# Patient Record
Sex: FEMALE | Race: OTHER | URBAN - METROPOLITAN AREA
[De-identification: names, ages, dates, MRNs, and addresses within clinical notes are randomized per-mention and may not be internally consistent; named-entity substitution may affect disease eponyms.]

---

## 2022-08-20 ENCOUNTER — EMERGENCY (EMERGENCY)
Facility: HOSPITAL | Age: 32
LOS: 1 days | Discharge: HOME | End: 2022-08-20
Attending: EMERGENCY MEDICINE | Admitting: EMERGENCY MEDICINE
Payer: MEDICARE

## 2022-08-20 VITALS
HEART RATE: 106 BPM | DIASTOLIC BLOOD PRESSURE: 87 MMHG | SYSTOLIC BLOOD PRESSURE: 118 MMHG | TEMPERATURE: 98 F | RESPIRATION RATE: 18 BRPM | OXYGEN SATURATION: 98 %

## 2022-08-20 VITALS
WEIGHT: 160.06 LBS | TEMPERATURE: 99 F | RESPIRATION RATE: 18 BRPM | OXYGEN SATURATION: 97 % | SYSTOLIC BLOOD PRESSURE: 119 MMHG | HEIGHT: 64 IN | HEART RATE: 125 BPM | DIASTOLIC BLOOD PRESSURE: 82 MMHG

## 2022-08-20 DIAGNOSIS — R19.7 DIARRHEA, UNSPECIFIED: ICD-10-CM

## 2022-08-20 DIAGNOSIS — R62.50 UNSPECIFIED LACK OF EXPECTED NORMAL PHYSIOLOGICAL DEVELOPMENT IN CHILDHOOD: ICD-10-CM

## 2022-08-20 DIAGNOSIS — Z20.822 CONTACT WITH AND (SUSPECTED) EXPOSURE TO COVID-19: ICD-10-CM

## 2022-08-20 DIAGNOSIS — R50.9 FEVER, UNSPECIFIED: ICD-10-CM

## 2022-08-20 DIAGNOSIS — E87.6 HYPOKALEMIA: ICD-10-CM

## 2022-08-20 LAB
ALBUMIN SERPL ELPH-MCNC: 3.8 G/DL — SIGNIFICANT CHANGE UP (ref 3.3–5)
ALP SERPL-CCNC: 80 U/L — SIGNIFICANT CHANGE UP (ref 40–120)
ALT FLD-CCNC: 6 U/L — LOW (ref 10–45)
ANION GAP SERPL CALC-SCNC: 14 MMOL/L — SIGNIFICANT CHANGE UP (ref 5–17)
ANISOCYTOSIS BLD QL: SLIGHT — SIGNIFICANT CHANGE UP
APPEARANCE UR: CLEAR — SIGNIFICANT CHANGE UP
AST SERPL-CCNC: 15 U/L — SIGNIFICANT CHANGE UP (ref 10–40)
BACTERIA # UR AUTO: PRESENT /HPF
BASOPHILS # BLD AUTO: 0 K/UL — SIGNIFICANT CHANGE UP (ref 0–0.2)
BASOPHILS NFR BLD AUTO: 0 % — SIGNIFICANT CHANGE UP (ref 0–2)
BILIRUB SERPL-MCNC: 0.4 MG/DL — SIGNIFICANT CHANGE UP (ref 0.2–1.2)
BILIRUB UR-MCNC: NEGATIVE — SIGNIFICANT CHANGE UP
BLASTS # FLD: 1.2 % — HIGH (ref 0–0)
BUN SERPL-MCNC: 12 MG/DL — SIGNIFICANT CHANGE UP (ref 7–23)
CALCIUM SERPL-MCNC: 8.6 MG/DL — SIGNIFICANT CHANGE UP (ref 8.4–10.5)
CHLORIDE SERPL-SCNC: 89 MMOL/L — LOW (ref 96–108)
CO2 SERPL-SCNC: 25 MMOL/L — SIGNIFICANT CHANGE UP (ref 22–31)
COLOR SPEC: YELLOW — SIGNIFICANT CHANGE UP
CREAT SERPL-MCNC: 0.74 MG/DL — SIGNIFICANT CHANGE UP (ref 0.5–1.3)
CULTURE RESULTS: SIGNIFICANT CHANGE UP
DIFF PNL FLD: ABNORMAL
EGFR: 110 ML/MIN/1.73M2 — SIGNIFICANT CHANGE UP
EOSINOPHIL # BLD AUTO: 0.06 K/UL — SIGNIFICANT CHANGE UP (ref 0–0.5)
EOSINOPHIL NFR BLD AUTO: 0.6 % — SIGNIFICANT CHANGE UP (ref 0–6)
EPI CELLS # UR: SIGNIFICANT CHANGE UP /HPF (ref 0–5)
GLUCOSE SERPL-MCNC: 116 MG/DL — HIGH (ref 70–99)
GLUCOSE UR QL: NEGATIVE — SIGNIFICANT CHANGE UP
HCT VFR BLD CALC: 41.5 % — SIGNIFICANT CHANGE UP (ref 34.5–45)
HGB BLD-MCNC: 13.6 G/DL — SIGNIFICANT CHANGE UP (ref 11.5–15.5)
KETONES UR-MCNC: NEGATIVE — SIGNIFICANT CHANGE UP
LACTATE SERPL-SCNC: 1.3 MMOL/L — SIGNIFICANT CHANGE UP (ref 0.5–2)
LEUKOCYTE ESTERASE UR-ACNC: NEGATIVE — SIGNIFICANT CHANGE UP
LIDOCAIN IGE QN: 24 U/L — SIGNIFICANT CHANGE UP (ref 7–60)
LYMPHOCYTES # BLD AUTO: 1.45 K/UL — SIGNIFICANT CHANGE UP (ref 1–3.3)
LYMPHOCYTES # BLD AUTO: 15.4 % — SIGNIFICANT CHANGE UP (ref 13–44)
MACROCYTES BLD QL: SLIGHT — SIGNIFICANT CHANGE UP
MANUAL SMEAR VERIFICATION: SIGNIFICANT CHANGE UP
MCHC RBC-ENTMCNC: 26 PG — LOW (ref 27–34)
MCHC RBC-ENTMCNC: 32.8 GM/DL — SIGNIFICANT CHANGE UP (ref 32–36)
MCV RBC AUTO: 79.2 FL — LOW (ref 80–100)
METAMYELOCYTES # FLD: 2.5 % — HIGH (ref 0–0)
MONOCYTES # BLD AUTO: 2.09 K/UL — HIGH (ref 0–0.9)
MONOCYTES NFR BLD AUTO: 22.2 % — HIGH (ref 2–14)
NEUTROPHILS # BLD AUTO: 4.31 K/UL — SIGNIFICANT CHANGE UP (ref 1.8–7.4)
NEUTROPHILS NFR BLD AUTO: 30.9 % — LOW (ref 43–77)
NEUTS BAND # BLD: 14.8 % — HIGH (ref 0–8)
NITRITE UR-MCNC: NEGATIVE — SIGNIFICANT CHANGE UP
OVALOCYTES BLD QL SMEAR: SLIGHT — SIGNIFICANT CHANGE UP
PH UR: 6 — SIGNIFICANT CHANGE UP (ref 5–8)
PLAT MORPH BLD: ABNORMAL
PLATELET # BLD AUTO: 468 K/UL — HIGH (ref 150–400)
POIKILOCYTOSIS BLD QL AUTO: SIGNIFICANT CHANGE UP
POLYCHROMASIA BLD QL SMEAR: SLIGHT — SIGNIFICANT CHANGE UP
POTASSIUM SERPL-MCNC: 3.2 MMOL/L — LOW (ref 3.5–5.3)
POTASSIUM SERPL-SCNC: 3.2 MMOL/L — LOW (ref 3.5–5.3)
PROT SERPL-MCNC: 7.5 G/DL — SIGNIFICANT CHANGE UP (ref 6–8.3)
PROT UR-MCNC: NEGATIVE MG/DL — SIGNIFICANT CHANGE UP
RBC # BLD: 5.24 M/UL — HIGH (ref 3.8–5.2)
RBC # FLD: 13.9 % — SIGNIFICANT CHANGE UP (ref 10.3–14.5)
RBC BLD AUTO: ABNORMAL
RBC CASTS # UR COMP ASSIST: < 5 /HPF — SIGNIFICANT CHANGE UP
SARS-COV-2 RNA SPEC QL NAA+PROBE: NEGATIVE — SIGNIFICANT CHANGE UP
SODIUM SERPL-SCNC: 128 MMOL/L — LOW (ref 135–145)
SP GR SPEC: <1.005 — SIGNIFICANT CHANGE UP (ref 1–1.03)
SPECIMEN SOURCE: SIGNIFICANT CHANGE UP
UROBILINOGEN FLD QL: 0.2 E.U./DL — SIGNIFICANT CHANGE UP
VARIANT LYMPHS # BLD: 12.4 % — HIGH (ref 0–6)
WBC # BLD: 9.43 K/UL — SIGNIFICANT CHANGE UP (ref 3.8–10.5)
WBC # FLD AUTO: 9.43 K/UL — SIGNIFICANT CHANGE UP (ref 3.8–10.5)
WBC UR QL: < 5 /HPF — SIGNIFICANT CHANGE UP

## 2022-08-20 PROCEDURE — 87635 SARS-COV-2 COVID-19 AMP PRB: CPT

## 2022-08-20 PROCEDURE — 85025 COMPLETE CBC W/AUTO DIFF WBC: CPT

## 2022-08-20 PROCEDURE — 99284 EMERGENCY DEPT VISIT MOD MDM: CPT | Mod: FS

## 2022-08-20 PROCEDURE — 80053 COMPREHEN METABOLIC PANEL: CPT

## 2022-08-20 PROCEDURE — 99283 EMERGENCY DEPT VISIT LOW MDM: CPT | Mod: 25

## 2022-08-20 PROCEDURE — 87507 IADNA-DNA/RNA PROBE TQ 12-25: CPT

## 2022-08-20 PROCEDURE — 36415 COLL VENOUS BLD VENIPUNCTURE: CPT

## 2022-08-20 PROCEDURE — 83690 ASSAY OF LIPASE: CPT

## 2022-08-20 PROCEDURE — 83605 ASSAY OF LACTIC ACID: CPT

## 2022-08-20 PROCEDURE — 81001 URINALYSIS AUTO W/SCOPE: CPT

## 2022-08-20 PROCEDURE — 96360 HYDRATION IV INFUSION INIT: CPT

## 2022-08-20 PROCEDURE — 87040 BLOOD CULTURE FOR BACTERIA: CPT

## 2022-08-20 RX ORDER — POTASSIUM CHLORIDE 20 MEQ
40 PACKET (EA) ORAL ONCE
Refills: 0 | Status: COMPLETED | OUTPATIENT
Start: 2022-08-20 | End: 2022-08-20

## 2022-08-20 RX ORDER — SODIUM CHLORIDE 9 MG/ML
1000 INJECTION INTRAMUSCULAR; INTRAVENOUS; SUBCUTANEOUS ONCE
Refills: 0 | Status: COMPLETED | OUTPATIENT
Start: 2022-08-20 | End: 2022-08-20

## 2022-08-20 RX ADMIN — SODIUM CHLORIDE 1000 MILLILITER(S): 9 INJECTION INTRAMUSCULAR; INTRAVENOUS; SUBCUTANEOUS at 13:06

## 2022-08-20 RX ADMIN — SODIUM CHLORIDE 1000 MILLILITER(S): 9 INJECTION INTRAMUSCULAR; INTRAVENOUS; SUBCUTANEOUS at 12:13

## 2022-08-20 RX ADMIN — SODIUM CHLORIDE 1000 MILLILITER(S): 9 INJECTION INTRAMUSCULAR; INTRAVENOUS; SUBCUTANEOUS at 12:54

## 2022-08-20 RX ADMIN — Medication 40 MILLIEQUIVALENT(S): at 12:54

## 2022-08-20 NOTE — ED PROVIDER NOTE - ATTENDING APP SHARED VISIT CONTRIBUTION OF CARE
30 yo developmentally delayed f presents to ED with parents secondary to several days of diarrhea.  No fevers, + recent  travel.  Denies abd pain.  No n/v.  On exam, patient in NAD.  Abd soft, NT/ND.  Will obtain labs, hydrate and dispo accordingly.

## 2022-08-20 NOTE — ED PROVIDER NOTE - CLINICAL SUMMARY MEDICAL DECISION MAKING FREE TEXT BOX
pt brought by parents for diarrhea x 1 wk, traveled to [place] (parents w/o any symptoms), no n/v/abd pain, mother states subjective fever but has never checked a temp. pt afebrile, well appearing, non toxic, benign abd, suspect traveler's diarrhea vs viral, labs consistent w/dehydration - K+ slightly low hence replaced, stool studies sent- will hold abx pending results, po hydration and BRAT diet discussed at length, parents understand and agree w/plan, strict return precautions given

## 2022-08-20 NOTE — ED PROVIDER NOTE - NSFOLLOWUPINSTRUCTIONS_ED_ALL_ED_FT
DRINK LOTS OF FLUIDS (WATER, GETORADE, VITAMIN WATER, PEDIALYTE), EAT BREAD, RICE, CEREAL, TOAST, APPLE SAUCE, BANANAS, JELLO, ETC. AVOID SPICY, FRIED, FATTY FOODS AND DAIRY   Food Choices to Help Relieve Diarrhea, Adult  Diarrhea can make you feel weak and cause you to become dehydrated. It is important to choose the right foods and drinks to:  •Relieve diarrhea.  •Replace lost fluids and nutrients  •Prevent dehydration.  What are tips for following this plan?  Relieving diarrhea   •Avoid foods that make your diarrhea worse. These may include:  •Foods and beverages sweetened with high-fructose corn syrup, honey, or sweeteners such as xylitol, sorbitol, and mannitol.  •Fried, greasy, or spicy foods.  •Raw fruits and vegetables.  •Eat foods that are rich in probiotics. These include foods such as yogurt and fermented milk products. Probiotics can help increase healthy bacteria in your stomach and intestines (gastrointestinal tract or GI tract). This may help digestion and stop diarrhea.  •If you have lactose intolerance, avoid dairy products. These may make your diarrhea worse.  •Take medicine to help stop diarrhea only as told by your health care provider.  Replacing nutrients   •Eat bland, easy-to-digest foods in small amounts as you are able, until your diarrhea starts to get better. These foods include bananas, applesauce, rice, toast, and crackers.  •Gradually reintroduce nutrient-rich foods as tolerated or as told by your health care provider. This includes:  •Well-cooked protein foods, such as eggs, lean meats like fish or chicken without skin, and tofu.  •Peeled, seeded, and soft-cooked fruits and vegetables.  •Low-fat dairy products.  •Whole grains.  •Take vitamin and mineral supplements as told by your health care provider.  Preventing dehydration   •Start by sipping water or a solution to prevent dehydration (oral rehydration solution, ORS). This is a drink that helps replace fluids and minerals your body has lost. You can buy an ORS at pharmacies and retail stores.  •Try to drink at least 8–10 cups (2,000–2,500 mL) of fluid each day to help replace lost fluids. If you have urine that is pale yellow, you are getting enough fluids.  •You may drink other liquids in addition to water, such as fruit juice that you have added water to (diluted fruit juice) or low-calorie sports drinks, as tolerated or as told by your health care provider.  •Avoid drinks with caffeine, such as coffee, tea, or soft drinks.  •Avoid alcohol  Summary  •When you have diarrhea, it is important to choose the right foods and drinks to relieve diarrhea, to replace lost fluids and nutrients, and to prevent dehydration.  •Make sure you drink enough fluid to keep your urine pale yellow.  •You may benefit from eating bland foods at first. Gradually reintroduce healthy, nutrient-rich foods as tolerated or as told by your health care provider.  •Avoid foods that make your diarrhea worse, such as fried, greasy, or spicy foods.

## 2022-08-20 NOTE — ED PROVIDER NOTE - OBJECTIVE STATEMENT
The pt is a 30 y/o developmentally delayed F, brought to ED by parents, for diarrhea x 1 wk. Just returned from [place], parents w/o any symptoms (were eating at same restaurants). Diarrhea is watery, greenish in color, no recent abx use. Subjective fevers - but mother states that never checked her temp.  Been giving immodium w/o relief. Pt denies any abd pain, n/v, cp, sob, dysuria.

## 2022-08-20 NOTE — ED ADULT NURSE NOTE - CHIEF COMPLAINT QUOTE
Pt c/o diarrhea, nausea, subjective fevers x1week. Reports recent travel to [place]. Hx of developmental delay. Denies vomiting, chest pain, SOB, urinary symptoms.

## 2022-08-20 NOTE — ED ADULT NURSE NOTE - OBJECTIVE STATEMENT
31 y.o female, alert, speaking in full sentences, no acute distress. Pt reports to ED with mom and dad c/o diarrhea, intermittent fevers x days. Per mom family was traveling and had dinner thursday night, pt has been having diarrhea since. Pt denies abdominal pain at this time. Denies n/v, headache, dizziness. Pt has developmental delay. Mom has been giving tylenol and imodium without relief. Pt afebrile at present.

## 2022-08-20 NOTE — ED ADULT TRIAGE NOTE - CHIEF COMPLAINT QUOTE
Pt c/o diarrhea, n/v, subjective fevers x1week. Reports recent travel to [place]. Hx of developmental delay. Denies chest pain, SOB, urinary symptoms. Pt c/o diarrhea, nausea, subjective fevers x1week. Reports recent travel to [place]. Hx of developmental delay. Denies vomiting, chest pain, SOB, urinary symptoms.

## 2022-08-20 NOTE — ED PROVIDER NOTE - PATIENT PORTAL LINK FT
You can access the FollowMyHealth Patient Portal offered by [facility] by registering at the following website: [email or web address]. By joining [employer]’s FollowMyHealth portal, you will also be able to view your health information using other applications (apps) compatible with our system.

## 2022-08-22 RX ORDER — AZITHROMYCIN 500 MG/1
1 TABLET, FILM COATED ORAL
Qty: 5 | Refills: 0
Start: 2022-08-22 | End: 2022-08-26

## 2022-08-25 LAB
CULTURE RESULTS: SIGNIFICANT CHANGE UP
CULTURE RESULTS: SIGNIFICANT CHANGE UP
SPECIMEN SOURCE: SIGNIFICANT CHANGE UP
SPECIMEN SOURCE: SIGNIFICANT CHANGE UP